# Patient Record
Sex: FEMALE | Race: OTHER | HISPANIC OR LATINO | URBAN - METROPOLITAN AREA
[De-identification: names, ages, dates, MRNs, and addresses within clinical notes are randomized per-mention and may not be internally consistent; named-entity substitution may affect disease eponyms.]

---

## 2019-03-31 ENCOUNTER — INPATIENT (INPATIENT)
Facility: HOSPITAL | Age: 39
LOS: 0 days | Discharge: LEFT BEFORE DISCHARGE | DRG: 392 | End: 2019-04-01
Attending: INTERNAL MEDICINE | Admitting: INTERNAL MEDICINE
Payer: SELF-PAY

## 2019-03-31 VITALS
TEMPERATURE: 98 F | WEIGHT: 158.73 LBS | SYSTOLIC BLOOD PRESSURE: 140 MMHG | OXYGEN SATURATION: 99 % | HEART RATE: 79 BPM | HEIGHT: 64 IN | DIASTOLIC BLOOD PRESSURE: 86 MMHG | RESPIRATION RATE: 19 BRPM

## 2019-03-31 LAB
ALBUMIN SERPL ELPH-MCNC: 4 G/DL — SIGNIFICANT CHANGE UP (ref 3.3–5)
ALBUMIN SERPL ELPH-MCNC: 4.5 G/DL — SIGNIFICANT CHANGE UP (ref 3.3–5)
ALP SERPL-CCNC: 119 U/L — SIGNIFICANT CHANGE UP (ref 40–120)
ALP SERPL-CCNC: 134 U/L — HIGH (ref 40–120)
ALT FLD-CCNC: 26 U/L — SIGNIFICANT CHANGE UP (ref 10–45)
ALT FLD-CCNC: 26 U/L — SIGNIFICANT CHANGE UP (ref 10–45)
AMYLASE P1 CFR SERPL: 61 U/L — SIGNIFICANT CHANGE UP (ref 25–125)
ANION GAP SERPL CALC-SCNC: 11 MMOL/L — SIGNIFICANT CHANGE UP (ref 5–17)
ANION GAP SERPL CALC-SCNC: 14 MMOL/L — SIGNIFICANT CHANGE UP (ref 5–17)
APPEARANCE UR: CLEAR — SIGNIFICANT CHANGE UP
APTT BLD: 35.1 SEC — SIGNIFICANT CHANGE UP (ref 27.5–36.3)
AST SERPL-CCNC: 20 U/L — SIGNIFICANT CHANGE UP (ref 10–40)
AST SERPL-CCNC: 20 U/L — SIGNIFICANT CHANGE UP (ref 10–40)
BASE EXCESS BLDV CALC-SCNC: -1.8 MMOL/L — SIGNIFICANT CHANGE UP
BASOPHILS # BLD AUTO: 0.05 K/UL — SIGNIFICANT CHANGE UP (ref 0–0.2)
BASOPHILS # BLD AUTO: 0.07 K/UL — SIGNIFICANT CHANGE UP (ref 0–0.2)
BASOPHILS NFR BLD AUTO: 0.8 % — SIGNIFICANT CHANGE UP (ref 0–2)
BASOPHILS NFR BLD AUTO: 0.8 % — SIGNIFICANT CHANGE UP (ref 0–2)
BILIRUB SERPL-MCNC: 0.2 MG/DL — SIGNIFICANT CHANGE UP (ref 0.2–1.2)
BILIRUB SERPL-MCNC: <0.2 MG/DL — SIGNIFICANT CHANGE UP (ref 0.2–1.2)
BILIRUB UR-MCNC: NEGATIVE — SIGNIFICANT CHANGE UP
BLD GP AB SCN SERPL QL: NEGATIVE — SIGNIFICANT CHANGE UP
BUN SERPL-MCNC: 15 MG/DL — SIGNIFICANT CHANGE UP (ref 7–23)
BUN SERPL-MCNC: 16 MG/DL — SIGNIFICANT CHANGE UP (ref 7–23)
CA-I SERPL-SCNC: 1.16 MMOL/L — SIGNIFICANT CHANGE UP (ref 1.12–1.3)
CALCIUM SERPL-MCNC: 8.9 MG/DL — SIGNIFICANT CHANGE UP (ref 8.4–10.5)
CALCIUM SERPL-MCNC: 9.4 MG/DL — SIGNIFICANT CHANGE UP (ref 8.4–10.5)
CHLORIDE SERPL-SCNC: 106 MMOL/L — SIGNIFICANT CHANGE UP (ref 96–108)
CHLORIDE SERPL-SCNC: 108 MMOL/L — SIGNIFICANT CHANGE UP (ref 96–108)
CO2 SERPL-SCNC: 20 MMOL/L — LOW (ref 22–31)
CO2 SERPL-SCNC: 22 MMOL/L — SIGNIFICANT CHANGE UP (ref 22–31)
COLOR SPEC: YELLOW — SIGNIFICANT CHANGE UP
CREAT SERPL-MCNC: 0.69 MG/DL — SIGNIFICANT CHANGE UP (ref 0.5–1.3)
CREAT SERPL-MCNC: 0.73 MG/DL — SIGNIFICANT CHANGE UP (ref 0.5–1.3)
DIFF PNL FLD: NEGATIVE — SIGNIFICANT CHANGE UP
EOSINOPHIL # BLD AUTO: 0.38 K/UL — SIGNIFICANT CHANGE UP (ref 0–0.5)
EOSINOPHIL # BLD AUTO: 0.55 K/UL — HIGH (ref 0–0.5)
EOSINOPHIL NFR BLD AUTO: 5.8 % — SIGNIFICANT CHANGE UP (ref 0–6)
EOSINOPHIL NFR BLD AUTO: 6.6 % — HIGH (ref 0–6)
GAS PNL BLDV: 136 MMOL/L — LOW (ref 138–146)
GAS PNL BLDV: SIGNIFICANT CHANGE UP
GAS PNL BLDV: SIGNIFICANT CHANGE UP
GLUCOSE SERPL-MCNC: 100 MG/DL — HIGH (ref 70–99)
GLUCOSE SERPL-MCNC: 122 MG/DL — HIGH (ref 70–99)
GLUCOSE UR QL: NEGATIVE — SIGNIFICANT CHANGE UP
HCG SERPL-ACNC: 0.4 MIU/ML — SIGNIFICANT CHANGE UP
HCO3 BLDV-SCNC: 22 MMOL/L — SIGNIFICANT CHANGE UP (ref 20–27)
HCT VFR BLD CALC: 32.6 % — LOW (ref 34.5–45)
HCT VFR BLD CALC: 34.9 % — SIGNIFICANT CHANGE UP (ref 34.5–45)
HCT VFR BLD CALC: 38.3 % — SIGNIFICANT CHANGE UP (ref 34.5–45)
HGB BLD-MCNC: 10.5 G/DL — LOW (ref 11.5–15.5)
HGB BLD-MCNC: 11.3 G/DL — LOW (ref 11.5–15.5)
HGB BLD-MCNC: 12.4 G/DL — SIGNIFICANT CHANGE UP (ref 11.5–15.5)
IMM GRANULOCYTES NFR BLD AUTO: 0.5 % — SIGNIFICANT CHANGE UP (ref 0–1.5)
IMM GRANULOCYTES NFR BLD AUTO: 0.6 % — SIGNIFICANT CHANGE UP (ref 0–1.5)
INR BLD: 0.99 — SIGNIFICANT CHANGE UP (ref 0.88–1.16)
KETONES UR-MCNC: NEGATIVE — SIGNIFICANT CHANGE UP
LACTATE SERPL-SCNC: 0.7 MMOL/L — SIGNIFICANT CHANGE UP (ref 0.5–2)
LACTATE SERPL-SCNC: 0.8 MMOL/L — SIGNIFICANT CHANGE UP (ref 0.5–2)
LACTATE SERPL-SCNC: 1.1 MMOL/L — SIGNIFICANT CHANGE UP (ref 0.5–2)
LEUKOCYTE ESTERASE UR-ACNC: NEGATIVE — SIGNIFICANT CHANGE UP
LIDOCAIN IGE QN: 26 U/L — SIGNIFICANT CHANGE UP (ref 7–60)
LYMPHOCYTES # BLD AUTO: 1.45 K/UL — SIGNIFICANT CHANGE UP (ref 1–3.3)
LYMPHOCYTES # BLD AUTO: 2.11 K/UL — SIGNIFICANT CHANGE UP (ref 1–3.3)
LYMPHOCYTES # BLD AUTO: 22 % — SIGNIFICANT CHANGE UP (ref 13–44)
LYMPHOCYTES # BLD AUTO: 25.2 % — SIGNIFICANT CHANGE UP (ref 13–44)
MCHC RBC-ENTMCNC: 27.8 PG — SIGNIFICANT CHANGE UP (ref 27–34)
MCHC RBC-ENTMCNC: 27.9 PG — SIGNIFICANT CHANGE UP (ref 27–34)
MCHC RBC-ENTMCNC: 28.1 PG — SIGNIFICANT CHANGE UP (ref 27–34)
MCHC RBC-ENTMCNC: 32.2 GM/DL — SIGNIFICANT CHANGE UP (ref 32–36)
MCHC RBC-ENTMCNC: 32.4 GM/DL — SIGNIFICANT CHANGE UP (ref 32–36)
MCHC RBC-ENTMCNC: 32.4 GM/DL — SIGNIFICANT CHANGE UP (ref 32–36)
MCV RBC AUTO: 86 FL — SIGNIFICANT CHANGE UP (ref 80–100)
MCV RBC AUTO: 86.5 FL — SIGNIFICANT CHANGE UP (ref 80–100)
MCV RBC AUTO: 86.8 FL — SIGNIFICANT CHANGE UP (ref 80–100)
MONOCYTES # BLD AUTO: 0.54 K/UL — SIGNIFICANT CHANGE UP (ref 0–0.9)
MONOCYTES # BLD AUTO: 0.6 K/UL — SIGNIFICANT CHANGE UP (ref 0–0.9)
MONOCYTES NFR BLD AUTO: 7.2 % — SIGNIFICANT CHANGE UP (ref 2–14)
MONOCYTES NFR BLD AUTO: 8.2 % — SIGNIFICANT CHANGE UP (ref 2–14)
NEUTROPHILS # BLD AUTO: 4.13 K/UL — SIGNIFICANT CHANGE UP (ref 1.8–7.4)
NEUTROPHILS # BLD AUTO: 4.98 K/UL — SIGNIFICANT CHANGE UP (ref 1.8–7.4)
NEUTROPHILS NFR BLD AUTO: 59.6 % — SIGNIFICANT CHANGE UP (ref 43–77)
NEUTROPHILS NFR BLD AUTO: 62.7 % — SIGNIFICANT CHANGE UP (ref 43–77)
NITRITE UR-MCNC: NEGATIVE — SIGNIFICANT CHANGE UP
NRBC # BLD: 0 /100 WBCS — SIGNIFICANT CHANGE UP (ref 0–0)
PCO2 BLDV: 34 MMHG — LOW (ref 41–51)
PH BLDV: 7.43 — SIGNIFICANT CHANGE UP (ref 7.32–7.43)
PH UR: 5.5 — SIGNIFICANT CHANGE UP (ref 5–8)
PLATELET # BLD AUTO: 219 K/UL — SIGNIFICANT CHANGE UP (ref 150–400)
PLATELET # BLD AUTO: 236 K/UL — SIGNIFICANT CHANGE UP (ref 150–400)
PLATELET # BLD AUTO: 247 K/UL — SIGNIFICANT CHANGE UP (ref 150–400)
PO2 BLDV: 202 MMHG — SIGNIFICANT CHANGE UP
POTASSIUM BLDV-SCNC: 4.2 MMOL/L — SIGNIFICANT CHANGE UP (ref 3.5–4.9)
POTASSIUM SERPL-MCNC: 4.2 MMOL/L — SIGNIFICANT CHANGE UP (ref 3.5–5.3)
POTASSIUM SERPL-MCNC: 4.3 MMOL/L — SIGNIFICANT CHANGE UP (ref 3.5–5.3)
POTASSIUM SERPL-SCNC: 4.2 MMOL/L — SIGNIFICANT CHANGE UP (ref 3.5–5.3)
POTASSIUM SERPL-SCNC: 4.3 MMOL/L — SIGNIFICANT CHANGE UP (ref 3.5–5.3)
PROT SERPL-MCNC: 6.7 G/DL — SIGNIFICANT CHANGE UP (ref 6–8.3)
PROT SERPL-MCNC: 7.5 G/DL — SIGNIFICANT CHANGE UP (ref 6–8.3)
PROT UR-MCNC: NEGATIVE MG/DL — SIGNIFICANT CHANGE UP
PROTHROM AB SERPL-ACNC: 11.2 SEC — SIGNIFICANT CHANGE UP (ref 10–12.9)
RBC # BLD: 3.77 M/UL — LOW (ref 3.8–5.2)
RBC # BLD: 4.06 M/UL — SIGNIFICANT CHANGE UP (ref 3.8–5.2)
RBC # BLD: 4.41 M/UL — SIGNIFICANT CHANGE UP (ref 3.8–5.2)
RBC # FLD: 15.9 % — HIGH (ref 10.3–14.5)
RH IG SCN BLD-IMP: POSITIVE — SIGNIFICANT CHANGE UP
RH IG SCN BLD-IMP: POSITIVE — SIGNIFICANT CHANGE UP
SAO2 % BLDV: 99 % — SIGNIFICANT CHANGE UP
SODIUM SERPL-SCNC: 139 MMOL/L — SIGNIFICANT CHANGE UP (ref 135–145)
SODIUM SERPL-SCNC: 142 MMOL/L — SIGNIFICANT CHANGE UP (ref 135–145)
SP GR SPEC: 1.02 — SIGNIFICANT CHANGE UP (ref 1–1.03)
UROBILINOGEN FLD QL: 0.2 E.U./DL — SIGNIFICANT CHANGE UP
WBC # BLD: 6.35 K/UL — SIGNIFICANT CHANGE UP (ref 3.8–10.5)
WBC # BLD: 6.58 K/UL — SIGNIFICANT CHANGE UP (ref 3.8–10.5)
WBC # BLD: 8.36 K/UL — SIGNIFICANT CHANGE UP (ref 3.8–10.5)
WBC # FLD AUTO: 6.35 K/UL — SIGNIFICANT CHANGE UP (ref 3.8–10.5)
WBC # FLD AUTO: 6.58 K/UL — SIGNIFICANT CHANGE UP (ref 3.8–10.5)
WBC # FLD AUTO: 8.36 K/UL — SIGNIFICANT CHANGE UP (ref 3.8–10.5)

## 2019-03-31 PROCEDURE — 93010 ELECTROCARDIOGRAM REPORT: CPT

## 2019-03-31 PROCEDURE — 43235 EGD DIAGNOSTIC BRUSH WASH: CPT

## 2019-03-31 PROCEDURE — 76937 US GUIDE VASCULAR ACCESS: CPT | Mod: 26,GC

## 2019-03-31 PROCEDURE — 99223 1ST HOSP IP/OBS HIGH 75: CPT | Mod: 25

## 2019-03-31 PROCEDURE — 99291 CRITICAL CARE FIRST HOUR: CPT

## 2019-03-31 PROCEDURE — 76830 TRANSVAGINAL US NON-OB: CPT | Mod: 26

## 2019-03-31 PROCEDURE — 71045 X-RAY EXAM CHEST 1 VIEW: CPT | Mod: 26,59,76,76

## 2019-03-31 PROCEDURE — 36556 INSERT NON-TUNNEL CV CATH: CPT | Mod: GC

## 2019-03-31 PROCEDURE — 76705 ECHO EXAM OF ABDOMEN: CPT | Mod: 26

## 2019-03-31 PROCEDURE — 76856 US EXAM PELVIC COMPLETE: CPT | Mod: 26

## 2019-03-31 PROCEDURE — 74176 CT ABD & PELVIS W/O CONTRAST: CPT | Mod: 26

## 2019-03-31 PROCEDURE — 71046 X-RAY EXAM CHEST 2 VIEWS: CPT | Mod: 26

## 2019-03-31 PROCEDURE — 74019 RADEX ABDOMEN 2 VIEWS: CPT | Mod: 26

## 2019-03-31 RX ORDER — INFLUENZA VIRUS VACCINE 15; 15; 15; 15 UG/.5ML; UG/.5ML; UG/.5ML; UG/.5ML
0.5 SUSPENSION INTRAMUSCULAR ONCE
Qty: 0 | Refills: 0 | Status: DISCONTINUED | OUTPATIENT
Start: 2019-03-31 | End: 2019-04-01

## 2019-03-31 RX ORDER — RACEPINEPHRINE HCL 2.25 %
1 SOLUTION, NON-ORAL TOPICAL ONCE
Qty: 0 | Refills: 0 | Status: DISCONTINUED | OUTPATIENT
Start: 2019-03-31 | End: 2019-03-31

## 2019-03-31 RX ORDER — DIPHENHYDRAMINE HCL 50 MG
25 CAPSULE ORAL ONCE
Qty: 0 | Refills: 0 | Status: COMPLETED | OUTPATIENT
Start: 2019-03-31 | End: 2019-03-31

## 2019-03-31 RX ORDER — PANTOPRAZOLE SODIUM 20 MG/1
40 TABLET, DELAYED RELEASE ORAL DAILY
Qty: 0 | Refills: 0 | Status: DISCONTINUED | OUTPATIENT
Start: 2019-03-31 | End: 2019-03-31

## 2019-03-31 RX ORDER — PROPOFOL 10 MG/ML
5 INJECTION, EMULSION INTRAVENOUS
Qty: 1000 | Refills: 0 | Status: DISCONTINUED | OUTPATIENT
Start: 2019-03-31 | End: 2019-04-01

## 2019-03-31 RX ORDER — PANTOPRAZOLE SODIUM 20 MG/1
80 TABLET, DELAYED RELEASE ORAL ONCE
Qty: 0 | Refills: 0 | Status: COMPLETED | OUTPATIENT
Start: 2019-03-31 | End: 2019-03-31

## 2019-03-31 RX ORDER — SODIUM CHLORIDE 9 MG/ML
1000 INJECTION INTRAMUSCULAR; INTRAVENOUS; SUBCUTANEOUS ONCE
Qty: 0 | Refills: 0 | Status: COMPLETED | OUTPATIENT
Start: 2019-03-31 | End: 2019-03-31

## 2019-03-31 RX ORDER — MIDAZOLAM HYDROCHLORIDE 1 MG/ML
2 INJECTION, SOLUTION INTRAMUSCULAR; INTRAVENOUS ONCE
Qty: 0 | Refills: 0 | Status: DISCONTINUED | OUTPATIENT
Start: 2019-03-31 | End: 2019-03-31

## 2019-03-31 RX ORDER — METOCLOPRAMIDE HCL 10 MG
10 TABLET ORAL ONCE
Qty: 0 | Refills: 0 | Status: COMPLETED | OUTPATIENT
Start: 2019-03-31 | End: 2019-03-31

## 2019-03-31 RX ORDER — MORPHINE SULFATE 50 MG/1
2 CAPSULE, EXTENDED RELEASE ORAL EVERY 4 HOURS
Qty: 0 | Refills: 0 | Status: DISCONTINUED | OUTPATIENT
Start: 2019-03-31 | End: 2019-03-31

## 2019-03-31 RX ORDER — FENTANYL CITRATE 50 UG/ML
0.5 INJECTION INTRAVENOUS
Qty: 2500 | Refills: 0 | Status: DISCONTINUED | OUTPATIENT
Start: 2019-03-31 | End: 2019-04-01

## 2019-03-31 RX ORDER — ONDANSETRON 8 MG/1
4 TABLET, FILM COATED ORAL ONCE
Qty: 0 | Refills: 0 | Status: COMPLETED | OUTPATIENT
Start: 2019-03-31 | End: 2019-03-31

## 2019-03-31 RX ORDER — HYDROMORPHONE HYDROCHLORIDE 2 MG/ML
2 INJECTION INTRAMUSCULAR; INTRAVENOUS; SUBCUTANEOUS ONCE
Qty: 0 | Refills: 0 | Status: DISCONTINUED | OUTPATIENT
Start: 2019-03-31 | End: 2019-03-31

## 2019-03-31 RX ORDER — HYDROMORPHONE HYDROCHLORIDE 2 MG/ML
1 INJECTION INTRAMUSCULAR; INTRAVENOUS; SUBCUTANEOUS EVERY 6 HOURS
Qty: 0 | Refills: 0 | Status: DISCONTINUED | OUTPATIENT
Start: 2019-03-31 | End: 2019-03-31

## 2019-03-31 RX ORDER — PANTOPRAZOLE SODIUM 20 MG/1
8 TABLET, DELAYED RELEASE ORAL
Qty: 80 | Refills: 0 | Status: DISCONTINUED | OUTPATIENT
Start: 2019-03-31 | End: 2019-03-31

## 2019-03-31 RX ORDER — PANTOPRAZOLE SODIUM 20 MG/1
40 TABLET, DELAYED RELEASE ORAL EVERY 12 HOURS
Qty: 0 | Refills: 0 | Status: DISCONTINUED | OUTPATIENT
Start: 2019-03-31 | End: 2019-04-01

## 2019-03-31 RX ORDER — HYDROMORPHONE HYDROCHLORIDE 2 MG/ML
1 INJECTION INTRAMUSCULAR; INTRAVENOUS; SUBCUTANEOUS ONCE
Qty: 0 | Refills: 0 | Status: DISCONTINUED | OUTPATIENT
Start: 2019-03-31 | End: 2019-03-31

## 2019-03-31 RX ADMIN — PANTOPRAZOLE SODIUM 80 MILLIGRAM(S): 20 TABLET, DELAYED RELEASE ORAL at 11:19

## 2019-03-31 RX ADMIN — PROPOFOL 2.27 MICROGRAM(S)/KG/MIN: 10 INJECTION, EMULSION INTRAVENOUS at 23:33

## 2019-03-31 RX ADMIN — Medication 1 MILLIGRAM(S): at 15:24

## 2019-03-31 RX ADMIN — Medication 25 MILLIGRAM(S): at 10:03

## 2019-03-31 RX ADMIN — HYDROMORPHONE HYDROCHLORIDE 1 MILLIGRAM(S): 2 INJECTION INTRAMUSCULAR; INTRAVENOUS; SUBCUTANEOUS at 13:30

## 2019-03-31 RX ADMIN — PROPOFOL 2.27 MICROGRAM(S)/KG/MIN: 10 INJECTION, EMULSION INTRAVENOUS at 17:10

## 2019-03-31 RX ADMIN — SODIUM CHLORIDE 1000 MILLILITER(S): 9 INJECTION INTRAMUSCULAR; INTRAVENOUS; SUBCUTANEOUS at 10:04

## 2019-03-31 RX ADMIN — Medication 10 MILLIGRAM(S): at 11:51

## 2019-03-31 RX ADMIN — HYDROMORPHONE HYDROCHLORIDE 1 MILLIGRAM(S): 2 INJECTION INTRAMUSCULAR; INTRAVENOUS; SUBCUTANEOUS at 10:40

## 2019-03-31 RX ADMIN — Medication 25 MILLIGRAM(S): at 11:19

## 2019-03-31 RX ADMIN — Medication 25 MILLIGRAM(S): at 17:30

## 2019-03-31 RX ADMIN — MIDAZOLAM HYDROCHLORIDE 2 MILLIGRAM(S): 1 INJECTION, SOLUTION INTRAMUSCULAR; INTRAVENOUS at 17:30

## 2019-03-31 RX ADMIN — PANTOPRAZOLE SODIUM 10 MG/HR: 20 TABLET, DELAYED RELEASE ORAL at 13:03

## 2019-03-31 RX ADMIN — FENTANYL CITRATE 3.77 MICROGRAM(S)/KG/HR: 50 INJECTION INTRAVENOUS at 17:20

## 2019-03-31 RX ADMIN — HYDROMORPHONE HYDROCHLORIDE 1 MILLIGRAM(S): 2 INJECTION INTRAMUSCULAR; INTRAVENOUS; SUBCUTANEOUS at 12:20

## 2019-03-31 RX ADMIN — HYDROMORPHONE HYDROCHLORIDE 1 MILLIGRAM(S): 2 INJECTION INTRAMUSCULAR; INTRAVENOUS; SUBCUTANEOUS at 11:50

## 2019-03-31 RX ADMIN — HYDROMORPHONE HYDROCHLORIDE 2 MILLIGRAM(S): 2 INJECTION INTRAMUSCULAR; INTRAVENOUS; SUBCUTANEOUS at 16:05

## 2019-03-31 RX ADMIN — HYDROMORPHONE HYDROCHLORIDE 1 MILLIGRAM(S): 2 INJECTION INTRAMUSCULAR; INTRAVENOUS; SUBCUTANEOUS at 13:15

## 2019-03-31 RX ADMIN — HYDROMORPHONE HYDROCHLORIDE 2 MILLIGRAM(S): 2 INJECTION INTRAMUSCULAR; INTRAVENOUS; SUBCUTANEOUS at 15:52

## 2019-03-31 RX ADMIN — ONDANSETRON 4 MILLIGRAM(S): 8 TABLET, FILM COATED ORAL at 11:19

## 2019-03-31 RX ADMIN — Medication 25 MILLIGRAM(S): at 23:31

## 2019-03-31 RX ADMIN — HYDROMORPHONE HYDROCHLORIDE 1 MILLIGRAM(S): 2 INJECTION INTRAMUSCULAR; INTRAVENOUS; SUBCUTANEOUS at 10:03

## 2019-03-31 NOTE — PROCEDURE NOTE - ADDITIONAL PROCEDURE DETAILS
Left Internal jugular vein was attempted first, cannulated easily however dilation failed in the setting of extensive scar tissues and procedure aborted, Dr. Osuna placed a right subclavian central line with easy cannulation and dilation without complication, patient remained HDS throughout the procedure, post procedure CXR obtained which shows proper line placement and no evidence of pneumothorax.

## 2019-03-31 NOTE — ED PROVIDER NOTE - CLINICAL SUMMARY MEDICAL DECISION MAKING FREE TEXT BOX
47 yo F with hematemesis 4-5 episodes, ongoing abd pain - neg CT - req admission for prbc transfusion and EGD

## 2019-03-31 NOTE — ED PROVIDER NOTE - CARE PLAN
Principal Discharge DX:	Nausea & vomiting  Secondary Diagnosis:	Hematemesis  Secondary Diagnosis:	Abdominal pain

## 2019-03-31 NOTE — CONSULT NOTE ADULT - SUBJECTIVE AND OBJECTIVE BOX
ICU Consult Medicine Resident Note    Pt is a 37 yo F with h/o Colonic perf s/p surgery in Dec 18 in [facility] with no other PMH presents with c/o N/V and hematemesis starting yesterday evening associated with 10/10 epigastric/Rt UQ  Sharp abdominal pain radiating to the back. Nothing makes it better and movement makes it worse.   Pt noticed the abdominal pain about 4 days ago with N/V which was initially 4/10 and feeds making it worse. For this the pt was taking Advil for the pain and for the last few days increased the dose. Pt also endorses having bowel movements with passing of flatus last movement this am in the ER waiting room.   Pt denies headache, blurry of vision, SOB, fevers, chills, Chest pain, palpitations, dysuria, leg swelling or dizziness.     In the ED Pt given Abdominal Xray with no obvious free air but visible IVC filter reason unknown. Pt given Dilaudid with benadryl x2 for itches.  Pt started on Protonix drip. Reglan and Zofran given.     Past Medical History: Denies   Past Surgical History: Colonic perf surgery   Medications: None   Allergies: IV contrast.   Social History: Never smoker, alcohol or recreational drugs.   Family History: Denies any pertinent surgery     Physical Examination:   Vital Signs Last 24 Hrs  T(C): 36.8 (31 Mar 2019 12:06), Max: 36.8 (31 Mar 2019 08:50)  T(F): 98.3 (31 Mar 2019 12:06), Max: 98.3 (31 Mar 2019 12:06)  HR: 91 (31 Mar 2019 12:06) (79 - 91)  BP: 152/91 (31 Mar 2019 12:06) (140/86 - 152/91)  BP(mean): --  RR: 18 (31 Mar 2019 12:06) (18 - 19)  SpO2: 100% (31 Mar 2019 12:06) (99% - 100%)  I&O's Detail    CAPILLARY BLOOD GLUCOSE      Constitutional: Pt lying in stretcher visibly in pain   Head: NC/AT  Eyes: PERRLA, EOMI, clear conjunctiva  ENT: no nasal discharge; no oropharyngeal erythema or exudates; moist oral mucosa  Neck: supple; no JVD or thyromegaly  Respiratory: CTA B/L; no W/R/R, no retractions  Cardiac: +S1/S2; RRR; no M/R/G; PMI non-displaced  Gastrointestinal: Tender, guarding, tense to palpation, multiple laprascopic scars with midline scar.  Extremities: WWP, no clubbing or cyanosis; no peripheral edema  Vascular: 2+ radial, DP/PT pulses B/L  Lymphatic: no submandibular or cervical LAD  Neurologic: AAOx3; answers questions appropriately, follows commands, moves all extremities, CNII-XII grossly intact; no focal deficits, motor 5/5 in UE and LE, Reflexes 2+ in UE and LE b/l      Labs/Imaging:  < from: CT Abdomen and Pelvis No Cont (03.31.19 @ 11:50) >  IMPRESSION:  1.  Hyperdense material in the stomach may represent blood products   versus ingested material.  2.  Hepatic steatosis.  3.  Asymmetric nodularity of left breast. Nonemergent breast ultrasound   and/or mammogram is recommended.  4.  Compression deformity T11 vertebral body.        CBC Full  -  ( 31 Mar 2019 09:39 )  WBC Count : 8.36 K/uL  RBC Count : 4.41 M/uL  Hemoglobin : 12.4 g/dL  Hematocrit : 38.3 %  Platelet Count - Automated : 247 K/uL  Mean Cell Volume : 86.8 fl  Mean Cell Hemoglobin : 28.1 pg  Mean Cell Hemoglobin Concentration : 32.4 gm/dL  Auto Neutrophil # : 4.98 K/uL  Auto Lymphocyte # : 2.11 K/uL  Auto Monocyte # : 0.60 K/uL  Auto Eosinophil # : 0.55 K/uL  Auto Basophil # : 0.07 K/uL  Auto Neutrophil % : 59.6 %  Auto Lymphocyte % : 25.2 %  Auto Monocyte % : 7.2 %  Auto Eosinophil % : 6.6 %  Auto Basophil % : 0.8 %    03-31    142  |  108  |  15  ----------------------------<  122<H>  4.3   |  20<L>  |  0.69    Ca    9.4      31 Mar 2019 09:39    TPro  7.5  /  Alb  4.5  /  TBili  0.2  /  DBili  x   /  AST  20  /  ALT  26  /  AlkPhos  134<H>  03-31    LIVER FUNCTIONS - ( 31 Mar 2019 09:39 )  Alb: 4.5 g/dL / Pro: 7.5 g/dL / ALK PHOS: 134 U/L / ALT: 26 U/L / AST: 20 U/L / GGT: x           Lactate, Blood (03.31.19 @ 10:51)    Lactate, Blood: 1.1 mmoL/L

## 2019-03-31 NOTE — ED ADULT TRIAGE NOTE - OTHER COMPLAINTS
pt states she has a hx of intestinal perforation and resection that was done in [facility] in December 2018.

## 2019-03-31 NOTE — PROCEDURE NOTE - NSPROCDETAILS_GEN_ALL_CORE
sterile technique, catheter placed/ultrasound guidance/lumen(s) aspirated and flushed/sterile dressing applied/guidewire recovered
lumen(s) aspirated and flushed/sterile dressing applied/ultrasound guidance/guidewire recovered/sterile technique, catheter placed
patient pre-oxygenated, tube inserted, placement confirmed

## 2019-03-31 NOTE — H&P ADULT - HISTORY OF PRESENT ILLNESS
39 yo F with h/o Colonic perforation s/p surgery in Dec 18 in [facility] with no other PMH presents with c/o N/V and hematemesis starting yesterday evening associated with 10/10 epigastric and RUQ, characterized as radiating to the back. Nothing makes it better and movement makes it worse.  Pt noticed the abdominal pain about 4 days ago with N/V which was initially 4/10 and feeds making it worse. For this the pt was taking Advil for the pain and for the last few days increased the dose. Pt also endorses having bowel movements with passing of flatus last movement this am in the ER waiting room.   Pt denies headache, blurry of vision, SOB, fevers, chills, Chest pain, palpitations, dysuria, leg swelling or dizziness.     In the ED Pt given Abdominal Xray with no obvious free air but visible IVC filter reason unknown. Pt given Dilaudid with benadryl x2 for itches.  Pt started on Protonix drip. Reglan and Zofran given.     Past Medical History: Denies   Past Surgical History: Colonic perf surgery   Medications: None   Allergies: IV contrast.   Social History: Never smoker, alcohol or recreational drugs.   Family History: Denies any pertinent surgery

## 2019-03-31 NOTE — CONSULT NOTE ADULT - SUBJECTIVE AND OBJECTIVE BOX
Surgery Consult Note:     38 year old female w/ h/o Colonic perf s/p colectomy in 12/18 in [place], h/o appendectomy in early 2000s w/ no other known PMH presents with 4 day history of abdominal pain that has become worse over the past day. Of note the patient had 4 episodes of hematemesis in the ED. The patient denies CP / SOB / fevers / chills. The patient continues to pass flatus and have bowel movements.       Vital Signs Last 24 Hrs  T(C): 36.8 (31 Mar 2019 12:06), Max: 36.8 (31 Mar 2019 08:50)  T(F): 98.3 (31 Mar 2019 12:06), Max: 98.3 (31 Mar 2019 12:06)  HR: 91 (31 Mar 2019 12:06) (79 - 91)  BP: 152/91 (31 Mar 2019 12:06) (140/86 - 152/91)  BP(mean): --  RR: 18 (31 Mar 2019 12:06) (18 - 19)  SpO2: 100% (31 Mar 2019 12:06) (99% - 100%)    PHYSICAL EXAM  Gen: visibly distressed   Resp: CTA BL   CV: RRR  Abd: soft, diffusely ttp in epigastric region with some guarding   healed midline exploratory laparotomy incision   no obvious masses or hernias     LABS:                        11.3   6.58  )-----------( 236      ( 31 Mar 2019 12:27 )             34.9     03-31    142  |  108  |  15  ----------------------------<  122<H>  4.3   |  20<L>  |  0.69    Ca    9.4      31 Mar 2019 09:39    TPro  7.5  /  Alb  4.5  /  TBili  0.2  /  DBili  x   /  AST  20  /  ALT  26  /  AlkPhos  134<H>  03-31          RADIOLOGY & ADDITIONAL STUDIES:    CXR --> no evidence of free air / obstruction   CT scan -->   1.  Hyperdense material in the stomach may represent blood products   versus ingested material.  2.  Hepatic steatosis.  3.  Asymmetric nodularity of left breast. Nonemergent breast ultrasound   and/or mammogram is recommended.  4.  Compression deformity T11 vertebral body.

## 2019-03-31 NOTE — H&P ADULT - ASSESSMENT
39 yo F with h/o as above presenting for Hematemesis with bright red blood with multiple episodes. Pt with only significant h/o colonic surgery for apparent perf in the past. Pt with dyspepsia like symptoms for the last 4 days and unknowingly aggravated symptoms with NSAIDS now with hematemesis. O/e pt noted to have tense, tender abdomen with guarding. Admitted to MICU for hematemesis likely 2/2 Peptic Ulcer and concerns for acute abdomen.      Neurology/psychiatry  No active issues      Cardiovascular:    Currently normotensive, not in hypovolemic shock (euvolemic on exam, not tachycardic, no chest pain)    Hemodynamics:   -Euvolemic on exam  -maintain MAP > 60-65  -active type and screen      Pulmonary:   No active issues      Infectious disease:   No leukocytosis, no fever, CXR clear    Gastrointestinal:   #Hematemesis  Presenting with 10/10 RUQ pain radiating to back with hematemesis. CXR negative for perforated viscou. Currently hemodynamically stable Differential include peptic ulcer 2/2 to recent advil use, Carole mcdaniel tear, less likely Boorhave syndrome.  -PPI     Renal/electrolytes:   no active issues      Endocrine:   no active issues      Hematology/oncology:   no active issues      Dermatology/MSK  no active issues      Nutrition  Fluids:  Electrolytes: Mg>2, K>4 replete PRN   Nutrition:     Prophylaxis  DVT:  GI:  Aspiration:     AccessLines:  -Central  -Peripheral yes    Sommers:no    Dispo: Full 37 yo F with h/o as above presenting for Hematemesis with bright red blood with multiple episodes. Pt with only significant h/o colonic surgery for apparent perf in the past. Pt with dyspepsia like symptoms for the last 4 days and unknowingly aggravated symptoms with NSAIDS now with hematemesis. O/e pt noted to have tense, tender abdomen with guarding. Admitted to MICU for hematemesis likely 2/2 Peptic Ulcer and concerns for acute abdomen.      Neurology/psychiatry  No active issues      Cardiovascular:    Currently normotensive, not in hypovolemic shock (euvolemic on exam, not tachycardic, no chest pain)    Hemodynamics:   -Euvolemic on exam  -maintain MAP > 60-65  -active type and screen      Pulmonary:   No active issues      Infectious disease:   No leukocytosis, no fever, CXR clear    Gastrointestinal:   #Hematemesis  Presenting with 10/10 RUQ pain radiating to back with hematemesis and abdominal pain x 4 days . CXR negative for perforated viscous. Currently hemodynamically stable Differential include peptic ulcer 2/2 to recent advil use, gastritis, foreign body ingestion, Carole mcdaniel tear, less likely Boorhave syndrome. Other considerations include ovarian torsion, bowel obstruction.   -CT A/P: hyperdense material in stomach may represent foreign body, no aortic aneurysm no pancreatitis, IVC filter in place  -lipase WNL   -PPI IV  -Central line due to pt having more peripheral access   -type and screen  -f/u abdominal ultrasound  -f/u GI recs, Ob/GYN    Renal/electrolytes:   no active issues      Endocrine:   no active issues      Hematology/oncology:   no active issues      Dermatology/MSK  no active issues      Nutrition  Fluids:  Electrolytes: Mg>2, K>4 replete PRN   Nutrition:     Prophylaxis  DVT:  GI:  Aspiration:     AccessLines:  -Central  -Peripheral yes    Sommers:no    Dispo: Full 37 yo F with h/o as above presenting for Hematemesis with bright red blood with multiple episodes. Pt with only significant h/o colonic surgery for apparent perf in the past. Pt with dyspepsia like symptoms for the last 4 days and unknowingly aggravated symptoms with NSAIDS now with hematemesis. O/e pt noted to have tense, tender abdomen with guarding. Admitted to MICU for hematemesis likely 2/2 Peptic Ulcer and concerns for acute abdomen.      Neurology/psychiatry  No active issues      Cardiovascular:    Currently normotensive, not in hypovolemic shock (euvolemic on exam, not tachycardic, no chest pain)    Hemodynamics:   -Euvolemic on exam  -maintain MAP > 60-65  -active type and screen      Pulmonary:   No active issues      Infectious disease:   No leukocytosis, no fever, CXR clear    Gastrointestinal:   #Hematemesis  Presenting with 10/10 RUQ pain radiating to back with hematemesis and abdominal pain x 4 days . Exam notable for guarding. CXR negative for perforated viscous. Currently hemodynamically stable Differential include peptic ulcer 2/2 to recent advil use, gastritis, foreign body ingestion, Carole mcdaniel tear, less likely Boorhave syndrome. Other considerations include ovarian torsion, bowel obstruction.   -CT A/P 3/31: hyperdense material in stomach may represent foreign body, no aortic aneurysm no pancreatitis, IVC filter in place  -AXR 3/31: unremarkable, no free air   -lipase WNL   -PPI gtt  -Central line due to pt having more peripheral access   -type and screen  -f/u TVUS   -f/u GI recs, Ob/GYN    #IVC filter?  -IVC filter seen on CT A/P imaging  -pt unable to provide history      Renal/electrolytes:   no active issues      Endocrine:   no active issues      Hematology/oncology:   no active issues      Dermatology/MSK  no active issues      Nutrition  Fluids: None  Electrolytes: Mg>2, K>4 replete PRN   Nutrition: NPO     Prophylaxis  DVT: None   GI: PPI gtt      AccessLines:  -Central   -Peripheral yes    Sommers:no  Dispo: Full 39 yo F with h/o as above presenting for Hematemesis with bright red blood with multiple episodes. Pt with only significant h/o colonic surgery for apparent perf in the past. Pt with dyspepsia like symptoms for the last 4 days and unknowingly aggravated symptoms with NSAIDS now with hematemesis. O/e pt noted to have tense, tender abdomen with guarding. Admitted to MICU for hematemesis likely 2/2 Peptic Ulcer and concerns for acute abdomen.      Neurology/psychiatry  No active issues      Cardiovascular:    Currently normotensive, not in hypovolemic shock (euvolemic on exam, not tachycardic, no chest pain)    Hemodynamics:   -Euvolemic on exam  -maintain MAP > 60-65  -active type and screen      Pulmonary:   No active issues      Infectious disease:   No leukocytosis, no fever, CXR clear    Gastrointestinal:   #Hematemesis  Presenting with 10/10 RUQ pain radiating to back with hematemesis and abdominal pain x 4 days . Exam notable for guarding. CXR negative for perforated viscous. Currently hemodynamically stable Differential include peptic ulcer 2/2 to recent advil use, gastritis, foreign body ingestion, Carole mcdaniel tear, less likely Boorhave syndrome. Other considerations include ovarian torsion, bowel obstruction.   -CT A/P 3/31: hyperdense material in stomach may represent foreign body, no aortic aneurysm no pancreatitis, IVC filter in place  -AXR 3/31: unremarkable, no free air   -lipase WNL   -PPI gtt  -Central line due to pt having more peripheral access   -type and screen  -f/u TVUS   -f/u GI recs, Ob/GYN    #IVC filter?  -IVC filter seen on CT A/P imaging  -pt unable to provide history      Renal/electrolytes:   no active issues      Endocrine:   no active issues      Hematology/oncology:   no active issues      Dermatology/MSK  no active issues      Nutrition  Fluids: None  Electrolytes: Mg>2, K>4 replete PRN   Nutrition: NPO     Prophylaxis  DVT: None in setting of hematemesis   GI: PPI gtt      AccessLines:  -Central   -Peripheral yes    Sommers:no  Dispo: Full

## 2019-03-31 NOTE — CONSULT NOTE ADULT - ATTENDING COMMENTS
Seen in ICU.  No additional vommiting.  Pain unchanged.  Ab very tender difusely with rigidity, no localization.  Chest clear , cor rr, ext warm.   Labs as above.  CT results reviewed.  No overt pathology that would explain patients findings.    A -severe abdominal pain of unclear cause/ upper gi bleed    Suggest:  Surg, gync to see  pelvic ultrasound  repeat labs lactate  GI to assess for endoscpy  possible repeat of ct with oral contrast

## 2019-03-31 NOTE — CONSULT NOTE ADULT - SUBJECTIVE AND OBJECTIVE BOX
Pt is a 39 yo postmenopausal P1 with h/o Colonic perf s/p surgery in Dec 18 in [facility] with no other PMH presents with c/o N/V and hematemesis starting yesterday evening associated with 10/10 epigastric/Rt UQ  Sharp abdominal pain radiating to the back. Nothing makes it better and movement makes it worse.   She reports the pain is now located in the epigastric region.  She denies any vaginal bleeding, and is currently sexually active.    OB/GYN Hx:  --  c/s; menopause at age 36 due to premature ovarian failure, follows with OBGYN at annual visits in [place], last saw ~1 year ago; no h/o STIs, reports vaginal discomfort on initial insertion during intercourse  Past Medical History: Denies   Past Surgical History: Colonic perf surgery   Medications: None   Allergies: IV contrast.   Social History: Never smoker, alcohol or recreational drugs.   Family History: Denies any pertinent surgery       PHYSICAL EXAM:   Vital Signs Last 24 Hrs  T(C): 36.8 (31 Mar 2019 13:32), Max: 36.8 (31 Mar 2019 08:50)  T(F): 98.2 (31 Mar 2019 13:32), Max: 98.3 (31 Mar 2019 12:06)  HR: 74 (31 Mar 2019 18:00) (74 - 94)  BP: 113/73 (31 Mar 2019 18:00) (113/73 - 152/91)  BP(mean): 95 (31 Mar 2019 18:00) (91 - 95)  RR: 12 (31 Mar 2019 18:00) (12 - 19)  SpO2: 99% (31 Mar 2019 18:00) (97% - 100%)    **************************  Constitutional: Alert & Oriented x3, NAD, NGT in place  Respiratory: regular work of breathing  Gastrointestinal: soft, non tender, positive bowel sounds, no rebound or guarding   Pelvic exam:   Extremities: no calf tenderness or swelling      LABS:                        11.3   6.58  )-----------( 236      ( 31 Mar 2019 12:27 )             34.9     -    142  |  108  |  15  ----------------------------<  122<H>  4.3   |  20<L>  |  0.69    Ca    9.4      31 Mar 2019 09:39    TPro  7.5  /  Alb  4.5  /  TBili  0.2  /  DBili  x   /  AST  20  /  ALT  26  /  AlkPhos  134<H>      PT/INR - ( 31 Mar 2019 13:52 )   PT: 11.2 sec;   INR: 0.99          PTT - ( 31 Mar 2019 13:52 )  PTT:35.1 sec    HCG Quantitative, Serum: .4 mIU/mL ( @ 09:39) Pt is a 39 yo postmenopausal P1 with h/o Colonic perf s/p surgery in Dec 18 in [facility] with no other PMH presents with c/o N/V and hematemesis starting yesterday evening associated with 10/10 epigastric/Rt UQ  Sharp abdominal pain radiating to the back. Nothing makes it better and movement makes it worse.   She reports the pain is now located in the epigastric region.  She denies any vaginal bleeding, and is currently sexually active.    OB/GYN Hx:  --  c/s; menopause at age 36 due to premature ovarian failure, follows with OBGYN at annual visits in [place], last saw ~1 year ago; no h/o STIs, reports vaginal discomfort on initial insertion during intercourse  Past Medical History: Denies   Past Surgical History: Colonic perf surgery   Medications: None   Allergies: IV contrast.   Social History: Never smoker, alcohol or recreational drugs.   Family History: Denies any pertinent surgery       PHYSICAL EXAM:   Vital Signs Last 24 Hrs  T(C): 36.8 (31 Mar 2019 13:32), Max: 36.8 (31 Mar 2019 08:50)  T(F): 98.2 (31 Mar 2019 13:32), Max: 98.3 (31 Mar 2019 12:06)  HR: 74 (31 Mar 2019 18:00) (74 - 94)  BP: 113/73 (31 Mar 2019 18:00) (113/73 - 152/91)  BP(mean): 95 (31 Mar 2019 18:00) (91 - 95)  RR: 12 (31 Mar 2019 18:00) (12 - 19)  SpO2: 99% (31 Mar 2019 18:00) (97% - 100%)    **************************  Constitutional: Alert & Oriented x3, NAD, NGT in place  Respiratory: regular work of breathing  Gastrointestinal: rigid abdomen, nondistended, diffusely tender on palpation, no rebound, +guarding,   Pelvic exam: deferred due to TVUS at bedside  Extremities: no calf tenderness or swelling      LABS:                        11.3   6.58  )-----------( 236      ( 31 Mar 2019 12:27 )             34.9     -    142  |  108  |  15  ----------------------------<  122<H>  4.3   |  20<L>  |  0.69    Ca    9.4      31 Mar 2019 09:39    TPro  7.5  /  Alb  4.5  /  TBili  0.2  /  DBili  x   /  AST  20  /  ALT  26  /  AlkPhos  134<H>      PT/INR - ( 31 Mar 2019 13:52 )   PT: 11.2 sec;   INR: 0.99          PTT - ( 31 Mar 2019 13:52 )  PTT:35.1 sec    HCG Quantitative, Serum: .4 mIU/mL ( @ 09:39)

## 2019-03-31 NOTE — CONSULT NOTE ADULT - ASSESSMENT
37 yo F with h/o as above presenting for Hematemesis with bright red blood with multiple episodes. Pt with only significant h/o colonic surgery for apparent perf in the past. Pt with dyspepsia like symptoms for the last 4 days and unknowingly aggravated symptoms with NSAIDS now with hematemesis. O/e pt noted to have tense, tender abdomen with guarding.  ICU consulted for hematemesis likely 2/2 Peptic Ulcer and concerns for acute abdomen. 37 yo F with h/o as above presenting for Hematemesis with bright red blood with multiple episodes. Pt with only significant h/o colonic surgery for apparent perf in the past. Pt with dyspepsia like symptoms for the last 4 days and unknowingly aggravated symptoms with NSAIDS now with hematemesis. O/e pt noted to have tense, tender abdomen with guarding.  ICU consulted for hematemesis likely 2/2 Peptic Ulcer and concerns for acute abdomen.     **Abdominal pain with Hematemesis likely 2/2 Bleeding peptic ulcer.   Pt with 4 day c/o abdominal pain worse since last night after taking NSAIDS for the pain with new episodes of hematemesis bright red blood.   Concern for bleeding ulcer vs perforation    -Type and screen.  -Central venous line given poor IV access  -CTAP without IV (contrast allergies)   -Protonix drip.  -Q4h CBC.   -GI consult.   -Surgery consult given exam concerning for acute abdomen with guarding and tense abdomen   -IV Dilaudid 1mg q6h prn for severe pain. (allergies to morphine)   -Pt will likely need to be electively intubated for EGD. 39 yo F with h/o as above presenting for Hematemesis with bright red blood with multiple episodes. Pt with only significant h/o colonic surgery for apparent perf in the past. Pt with dyspepsia like symptoms for the last 4 days and unknowingly aggravated symptoms with NSAIDS now with hematemesis. O/e pt noted to have tense, tender abdomen with guarding.  ICU consulted for hematemesis likely 2/2 Peptic Ulcer and concerns for acute abdomen.     **Abdominal pain with Hematemesis likely 2/2 Bleeding peptic ulcer.   Pt with 4 day c/o abdominal pain worse since last night after taking NSAIDS for the pain with new episodes of hematemesis bright red blood.   Concern for bleeding ulcer vs perforation    -Type and screen.  -Central venous line given poor IV access  -CTAP without IV (contrast allergies)   -Protonix drip.  -Q4h CBC.   -GI consult.   -Surgery consult given exam concerning for acute abdomen with guarding and tense abdomen   -IV Dilaudid 1mg q6h prn for severe pain. (allergies to morphine)   -Pt will likely need to be electively intubated for EGD.   -NPO

## 2019-03-31 NOTE — ED ADULT NURSE NOTE - OBJECTIVE STATEMENT
AOX4 +ambulatory patient reports hx of bowel resection in [facility] x January c/o right sided abdominal pain, vomiting blood and diarrhea. Patient states subjective fevers denies any blood in the stool

## 2019-03-31 NOTE — ED ADULT NURSE NOTE - OTHER COMPLAINTS
pt states she has a hx of intestinal perforation and resection that was done in [facility] in December 2018.

## 2019-03-31 NOTE — CONSULT NOTE ADULT - ASSESSMENT
47 yo F with hx of colonic perf 1 year ago prior colectomy bowel resection (X- lap in [facility]) and appy with abd pain and hematemesis    Hematemesis: likley 2/2 Carole mcdaniel/gastritis/PUD/  -hb normal at 12.4, /90, hr 78  -zofran as needed  -PPI IV   -npo  -CT abdomen in the setting of pain abdomen with vomiting, also has pmh of abd surgeries 47 yo F with hx of colonic perf 1 year ago prior colectomy bowel resection (X- lap in [facility]) and appy with abd pain and hematemesis    Hematemesis: likley 2/2 Carole mcdaniel/gastritis/PUD  -hb normal at 12.4, /90, hr 78  -CT abdomen shows ingested food/ blood in stomach, significant stool burden, no obstruction/perf, clip seen in stomach (pt denies any prior GI bleed)  -NGT for stomach decompression  -will plan for EGD today, will need intubation for procedure  -2 large bore IV  -trend cbc  -zofran as needed  -PPI IV   -npo

## 2019-03-31 NOTE — ED PROVIDER NOTE - OBJECTIVE STATEMENT
49 yo F with hx of colonic perf 1 year ago prior colectomy bowel resection (X- lap in [facility]) and appy with onset of abd pain last nite  N/V  x 4 episodes some brb  non bilious last BM 2 days ago  pos flatus  no prior hx of SBO since surgery  no dysuria or freq  no chills no vag dc or bleeding LMP  approx 3 weeks ago  no hx of PUD or gastritis  no etoh use  non smoker 49 yo F with hx of colonic perf 1 year ago prior colectomy bowel resection (X- lap in [facility]) and appy with onset of abd pain last nite  N/V  x 4 episodes some brb  non bilious last BM 2 days ago  pos flatus  no prior hx of SBO since surgery  no dysuria or freq  no chills no vag dc or bleeding LMP  approx 3 weeks ago  no hx of PUD or gastritis  no etoh use  non smoker  --pt now admits to taking advil x 4 days 47 yo F with hx of colonic perf 1 year ago prior colectomy bowel resection (X- lap in [facility]) and appy  20 years ago with onset of abd pain last nite at aprrox 11 pm  -N/V  x 4 episodes some brb  non bilious last BM 2 days ago  pos flatus  no prior hx of SBO since surgery  no dysuria or freq  no chills no vag dc or bleeding LMP  approx 3 weeks ago  no hx of PUD or gastritis  no etoh use  non smoker  --pt now admits to taking advil x 4 days

## 2019-03-31 NOTE — PROCEDURE NOTE - NSICDXPROCEDURE_GEN_ALL_CORE_FT
PROCEDURES:  Insertion of triple lumen catheter with imaging guidance 31-Mar-2019 22:24:23  Jaelyn Kyle
PROCEDURES:  Intubation, trachea 31-Mar-2019 17:00:05  Velma Fernandez

## 2019-03-31 NOTE — CONSULT NOTE ADULT - CONSULT REASON
Abdominal pain
Pt with c/o Abdominal pain with hematemesis
hematemesis
abdominal pain and hematemesis

## 2019-03-31 NOTE — PROGRESS NOTE ADULT - SUBJECTIVE AND OBJECTIVE BOX
Received verbal report that there were no significant findings on EGD, and no active bleeding. Abdominal exam remains soft, non-distended. Patient intubated and sedated so unable to assess tenderness/pain.

## 2019-03-31 NOTE — ED ADULT NURSE NOTE - NSIMPLEMENTINTERV_GEN_ALL_ED
Implemented All Universal Safety Interventions:  [place] to call system. Call bell, personal items and telephone within reach. Instruct patient to call for assistance. Room bathroom lighting operational. Non-slip footwear when patient is off stretcher. Physically safe environment: no spills, clutter or unnecessary equipment. Stretcher in lowest position, wheels locked, appropriate side rails in place.

## 2019-03-31 NOTE — CONSULT NOTE ADULT - ASSESSMENT
38 year old female w/ h/o Colonic perf s/p colectomy in 12/18 in [place], h/o appendectomy in early 2000s w/ no other known PMH presents with abdominal pain and hematemesis   - patient admitted to the MICU w/ GI and surgery consulted   - agree w/ protonix drip and would recommend GI endoscopy to determine source of GI bleed   - consider IR consult, patient may benefit from embolization if becomes hemodynamically unstable   - abdominal exam impressive, will perform serial abdominal exams, however AXR / CT scan unremarkable for acute intra abdominal pathology of presence of free air   - serial CBCs in the setting of UGI bleeding   - discussed with chief on call 38 year old female w/ h/o Colonic perf s/p colectomy in 12/18 in [place], h/o appendectomy in early 2000s w/ no other known PMH presents with abdominal pain and hematemesis   - patient admitted to the MICU w/ GI and surgery consulted   - agree w/ protonix drip and would recommend GI endoscopy to determine source of GI bleed, concern for possible PUD   - consider IR consult, patient may benefit from embolization if becomes hemodynamically unstable   - abdominal exam impressive, will perform serial abdominal exams, however AXR / CT scan unremarkable for acute intra abdominal pathology of presence of free air   - serial CBCs in the setting of UGI bleeding   - discussed with chief on call 38 year old female w/ h/o Colonic perf s/p colectomy in 12/18 in [place], h/o appendectomy in early 2000s w/ no other known PMH presents with abdominal pain and hematemesis   - patient admitted to the MICU w/ GI and surgery consulted   - agree w/ protonix drip and would recommend urgent GI endoscopy to determine source of GI bleed, concern for possible PUD   - IR consult, patient may benefit from embolization if becomes hemodynamically unstable   - abdominal exam impressive, will perform serial abdominal exams, however AXR / CT scan unremarkable for acute intra abdominal pathology of presence of free air   - serial CBCs in the setting of UGI bleeding   - continue resuscitative measures in MICU   - discussed with chief on call

## 2019-03-31 NOTE — H&P ADULT - NSHPLABSRESULTS_GEN_ALL_CORE
EXAM:  CT ABDOMEN AND PELVIS                          PROCEDURE DATE:  03/31/2019          INTERPRETATION:  CT of the ABDOMEN and PELVIS     INDICATION: Abdominal pain and hematemesis.    TECHNIQUE: CT of the abdomen and pelvis was performed. Intravenous and   oral contrast material not administered, as per ordering doctor's   request. Axial, sagittal, and coronal images were produced and reviewed.    PRIOR STUDIES: None.    FINDINGS: Images of the lower chest demonstrate asymmetric nodularity of   the left breast.     Hypodense liver parenchyma is consistent with hepatic steatosis.   No   radiopaque gallstones are seen.  The pancreas, spleen, adrenal glands,   and kidneys are unremarkable.    There is an IVC filter in place. A few prongs of the filter extend beyond   the wall of the inferior vena cava into the adjacent fat. No abdominal   aortic aneurysm is seen.     No lymphadenopathy or ascites in abdomen or pelvis.    Metallic clip present greater curvature of gastric body. There is   intraluminal hyperdense material in the stomach, which may represent   ingested food contents versus hemorrhage in the setting of hematemesis.   Metallic coils are present in the gastrohepatic ligament. Status post   appendectomy. Anastomosis present mid transverse colon. There is a   midline abdominal wall scar.    Images of the pelvis demonstrate the uterus and adnexae to be normal in   appearance. No filling defects are seen in the urinary bladder.     Evaluation of the osseous structures demonstrates compression deformity   of the superior endplate of the T11 vertebral body.    IMPRESSION:  1.  Hyperdense material in the stomach may represent blood products   versus ingested material.  2.  Hepatic steatosis.  3.  Asymmetric nodularity of left breast. Nonemergent breast ultrasound   and/or mammogram is recommended.  4.  Compression deformity T11 vertebral body.

## 2019-03-31 NOTE — H&P ADULT - ATTENDING COMMENTS
Seen in ICU.  On exam abd very tender to minimal palpation with rigidity., chest cealr , cor rr, ext warm. Labs as above.  Cr results reviewed.  No overt pathology that would explain patients finding    A -severe abdominal pain of unclear cause/ upper gi bleed    Suggest  Surg, gync to see  pelvic ultrasound  repeat labs lactate  GI to assess for endoscpy  possible repeat of ct with oral contrast Seen in ICU.  Pain unchanged, no additonal vommitting.  On exam abd very tender to minimal palpation diffusely without localization with rigidity., chest clear , cor rr, ext warm. Labs as above.  CT results reviewed.  No overt pathology that would explain patients findings.    A -severe abdominal pain of unclear cause/ upper gi bleed    Suggest:  Surg, gync to see  pelvic ultrasound  repeat labs lactate  GI to assess for endoscpy  possible repeat of ct with oral contrast

## 2019-03-31 NOTE — CONSULT NOTE ADULT - SUBJECTIVE AND OBJECTIVE BOX
HPI: 49 yo F with hx of colonic perf 1 year ago prior colectomy bowel resection (X- lap in [facility]) and appy with onset of abd pain last nite  N/V  x 4 episodes some brb  non bilious last BM 2 days ago,  passing  flatus    no prior hx of SBO since surgery  no dysuria or freq  no chills no vag dc or bleeding LMP  approx 3 weeks ago  no hx of PUD or gastritis  no etoh use  non smoker    Allergies    IV Contrast (Anaphylaxis)  morphine (Hives; Rash)    Intolerances      Home Medications:    MEDICATIONS:  MEDICATIONS  (STANDING):  diphenhydrAMINE   Injectable 25 milliGRAM(s) IV Push Once  HYDROmorphone  Injectable 1 milliGRAM(s) IV Push Once  ondansetron Injectable 4 milliGRAM(s) IV Push Once  pantoprazole  Injectable 80 milliGRAM(s) IV Push Once  pantoprazole Infusion 8 mG/Hr (10 mL/Hr) IV Continuous <Continuous>    MEDICATIONS  (PRN):    PAST MEDICAL & SURGICAL HISTORY:    FAMILY HISTORY:    SOCIAL HISTORY:  [place]: [ ] Current, [ ] Former, [ ] Never; Pack Years:  Alcohol:  Illicit Drugs:    REVIEW OF SYSTEMS:  CONSTITUTIONAL: No weakness, fevers or chills  HEENT: No visual changes; No vertigo or throat pain   NECK: No pain or stiffness  RESPIRATORY: No cough, wheezing, hemoptysis; No shortness of breath  CARDIOVASCULAR: No chest pain or palpitations  GASTROINTESTINAL: No abdominal or epigastric pain. No nausea, vomiting, or hematemesis; No diarrhea or constipation. No melena or hematochezia.  GENITOURINARY: No dysuria, frequency or hematuria  NEUROLOGICAL: No numbness or weakness  SKIN: No itching, burning, rashes, or lesions   All other 10 review of systems is negative unless indicated above.    Vital Signs Last 24 Hrs  T(C): 36.8 (31 Mar 2019 08:50), Max: 36.8 (31 Mar 2019 08:50)  T(F): 98.2 (31 Mar 2019 08:50), Max: 98.2 (31 Mar 2019 08:50)  HR: 79 (31 Mar 2019 08:50) (79 - 79)  BP: 140/86 (31 Mar 2019 08:50) (140/86 - 140/86)  BP(mean): --  RR: 19 (31 Mar 2019 08:50) (19 - 19)  SpO2: 99% (31 Mar 2019 08:50) (99% - 99%)      PHYSICAL EXAM:    General: Well developed; well nourished; in no acute distress  Eyes: Anicteric sclerae, moist conjunctivae  HENT: Moist mucous membranes  Neck: Trachea midline, supple  Lungs: Normal respiratory effors and no intercostal retractions  Cardiovascular: RRR  Abdomen: Soft, non-tender non-distended; Normal bowel sounds; No rebound or guarding  Extremities: Normal range of motion, No clubbing, cyanosis or edema  Neurological: Alert and oriented x3  Skin: Warm and dry. No obvious rash    LABS:                        12.4   8.36  )-----------( 247      ( 31 Mar 2019 09:39 )             38.3     03-31    142  |  108  |  15  ----------------------------<  122<H>  4.3   |  20<L>  |  0.69    Ca    9.4      31 Mar 2019 09:39    TPro  7.5  /  Alb  4.5  /  TBili  0.2  /  DBili  x   /  AST  20  /  ALT  26  /  AlkPhos  134<H>  03-31            RADIOLOGY & ADDITIONAL STUDIES:

## 2019-03-31 NOTE — ED PROVIDER NOTE - PROGRESS NOTE DETAILS
GI to see pt-  pos hematemesis - approx 100cc - no clots-  BP  VSS - ICU paged-  surgery to see as well  lactate neg

## 2019-03-31 NOTE — PROCEDURE NOTE - NSINFORMCONSENT_GEN_A_CORE
Benefits, risks, and possible complications of procedure explained to patient/caregiver who verbalized understanding and gave verbal consent.
Benefits, risks, and possible complications of procedure explained to patient/caregiver who verbalized understanding and gave verbal consent.
consent obtained by day team/Benefits, risks, and possible complications of procedure explained to patient/caregiver who verbalized understanding and gave written consent.

## 2019-03-31 NOTE — H&P ADULT - NSHPPHYSICALEXAM_GEN_ALL_CORE
Constitutional: Pt lying in stretcher visibly in pain   Head: NC/AT  Eyes: PERRLA, EOMI, clear conjunctiva  ENT: no nasal discharge; no oropharyngeal erythema or exudates; moist oral mucosa  Neck: supple; no JVD or thyromegaly  Respiratory: CTA B/L; no W/R/R, no retractions  Cardiac: +S1/S2; RRR; no M/R/G; PMI non-displaced  Gastrointestinal: Tender, guarding, tense to palpation, multiple laprascopic scars with midline scar.  Extremities: WWP, no clubbing or cyanosis; no peripheral edema  Vascular: 2+ radial, DP/PT pulses B/L  Lymphatic: no submandibular or cervical LAD  Neurologic: AAOx3; answers questions appropriately, follows commands, moves all extremities, CNII-XII grossly intact; no focal deficits, motor 5/5 in UE and LE, Reflexes 2+ in UE and LE b/l

## 2019-03-31 NOTE — CONSULT NOTE ADULT - ASSESSMENT
Pt is a 37 yo postmenopausal P1 with h/o Colonic perf s/p surgery in Dec 18 presenting with hematemesis.   - unable to perform pelvic exam due to TVUS being performed at time of evaluation  - will f/u results of TVUS  - due to patient's history and physical exam/location of pain, little concern for GYN etiology of pain Pt is a 37 yo postmenopausal P1 with h/o Colonic perf s/p surgery in Dec 18 presenting with hematemesis.   - unable to perform pelvic exam due to TVUS being performed at time of evaluation  - TVUS normal with no evidence of torsion or any ovarian or uterine masses or cysts  - due to patient's history and physical exam/location of pain, little concern for GYN etiology of pain  - GYN signing off

## 2019-04-01 VITALS
HEART RATE: 88 BPM | SYSTOLIC BLOOD PRESSURE: 117 MMHG | DIASTOLIC BLOOD PRESSURE: 82 MMHG | RESPIRATION RATE: 22 BRPM | OXYGEN SATURATION: 95 %

## 2019-04-01 LAB
ALBUMIN SERPL ELPH-MCNC: 3.6 G/DL — SIGNIFICANT CHANGE UP (ref 3.3–5)
ALP SERPL-CCNC: 110 U/L — SIGNIFICANT CHANGE UP (ref 40–120)
ALT FLD-CCNC: 23 U/L — SIGNIFICANT CHANGE UP (ref 10–45)
ANION GAP SERPL CALC-SCNC: 10 MMOL/L — SIGNIFICANT CHANGE UP (ref 5–17)
AST SERPL-CCNC: 23 U/L — SIGNIFICANT CHANGE UP (ref 10–40)
BASOPHILS # BLD AUTO: 0.02 K/UL — SIGNIFICANT CHANGE UP (ref 0–0.2)
BASOPHILS NFR BLD AUTO: 0.2 % — SIGNIFICANT CHANGE UP (ref 0–2)
BILIRUB SERPL-MCNC: 0.2 MG/DL — SIGNIFICANT CHANGE UP (ref 0.2–1.2)
BUN SERPL-MCNC: 15 MG/DL — SIGNIFICANT CHANGE UP (ref 7–23)
CALCIUM SERPL-MCNC: 8.5 MG/DL — SIGNIFICANT CHANGE UP (ref 8.4–10.5)
CHLORIDE SERPL-SCNC: 110 MMOL/L — HIGH (ref 96–108)
CO2 SERPL-SCNC: 22 MMOL/L — SIGNIFICANT CHANGE UP (ref 22–31)
CREAT SERPL-MCNC: 0.66 MG/DL — SIGNIFICANT CHANGE UP (ref 0.5–1.3)
EOSINOPHIL # BLD AUTO: 0.49 K/UL — SIGNIFICANT CHANGE UP (ref 0–0.5)
EOSINOPHIL NFR BLD AUTO: 5.2 % — SIGNIFICANT CHANGE UP (ref 0–6)
GLUCOSE SERPL-MCNC: 90 MG/DL — SIGNIFICANT CHANGE UP (ref 70–99)
HCT VFR BLD CALC: 30.5 % — LOW (ref 34.5–45)
HGB BLD-MCNC: 9.8 G/DL — LOW (ref 11.5–15.5)
IMM GRANULOCYTES NFR BLD AUTO: 0.4 % — SIGNIFICANT CHANGE UP (ref 0–1.5)
LYMPHOCYTES # BLD AUTO: 1.32 K/UL — SIGNIFICANT CHANGE UP (ref 1–3.3)
LYMPHOCYTES # BLD AUTO: 14.1 % — SIGNIFICANT CHANGE UP (ref 13–44)
MCHC RBC-ENTMCNC: 28.4 PG — SIGNIFICANT CHANGE UP (ref 27–34)
MCHC RBC-ENTMCNC: 32.1 GM/DL — SIGNIFICANT CHANGE UP (ref 32–36)
MCV RBC AUTO: 88.4 FL — SIGNIFICANT CHANGE UP (ref 80–100)
MONOCYTES # BLD AUTO: 0.75 K/UL — SIGNIFICANT CHANGE UP (ref 0–0.9)
MONOCYTES NFR BLD AUTO: 8 % — SIGNIFICANT CHANGE UP (ref 2–14)
NEUTROPHILS # BLD AUTO: 6.77 K/UL — SIGNIFICANT CHANGE UP (ref 1.8–7.4)
NEUTROPHILS NFR BLD AUTO: 72.1 % — SIGNIFICANT CHANGE UP (ref 43–77)
NRBC # BLD: 0 /100 WBCS — SIGNIFICANT CHANGE UP (ref 0–0)
PLATELET # BLD AUTO: 205 K/UL — SIGNIFICANT CHANGE UP (ref 150–400)
POTASSIUM SERPL-MCNC: 3.9 MMOL/L — SIGNIFICANT CHANGE UP (ref 3.5–5.3)
POTASSIUM SERPL-SCNC: 3.9 MMOL/L — SIGNIFICANT CHANGE UP (ref 3.5–5.3)
PROT SERPL-MCNC: 6 G/DL — SIGNIFICANT CHANGE UP (ref 6–8.3)
RBC # BLD: 3.45 M/UL — LOW (ref 3.8–5.2)
RBC # FLD: 16.2 % — HIGH (ref 10.3–14.5)
SODIUM SERPL-SCNC: 142 MMOL/L — SIGNIFICANT CHANGE UP (ref 135–145)
WBC # BLD: 9.39 K/UL — SIGNIFICANT CHANGE UP (ref 3.8–10.5)
WBC # FLD AUTO: 9.39 K/UL — SIGNIFICANT CHANGE UP (ref 3.8–10.5)

## 2019-04-01 PROCEDURE — 76830 TRANSVAGINAL US NON-OB: CPT

## 2019-04-01 PROCEDURE — 84295 ASSAY OF SERUM SODIUM: CPT

## 2019-04-01 PROCEDURE — 81003 URINALYSIS AUTO W/O SCOPE: CPT

## 2019-04-01 PROCEDURE — 94002 VENT MGMT INPAT INIT DAY: CPT

## 2019-04-01 PROCEDURE — 93005 ELECTROCARDIOGRAM TRACING: CPT

## 2019-04-01 PROCEDURE — 71046 X-RAY EXAM CHEST 2 VIEWS: CPT

## 2019-04-01 PROCEDURE — 76856 US EXAM PELVIC COMPLETE: CPT

## 2019-04-01 PROCEDURE — 74019 RADEX ABDOMEN 2 VIEWS: CPT

## 2019-04-01 PROCEDURE — 80053 COMPREHEN METABOLIC PANEL: CPT

## 2019-04-01 PROCEDURE — 82150 ASSAY OF AMYLASE: CPT

## 2019-04-01 PROCEDURE — 74018 RADEX ABDOMEN 1 VIEW: CPT | Mod: 26

## 2019-04-01 PROCEDURE — 83605 ASSAY OF LACTIC ACID: CPT

## 2019-04-01 PROCEDURE — 82803 BLOOD GASES ANY COMBINATION: CPT

## 2019-04-01 PROCEDURE — 94003 VENT MGMT INPAT SUBQ DAY: CPT

## 2019-04-01 PROCEDURE — 82330 ASSAY OF CALCIUM: CPT

## 2019-04-01 PROCEDURE — 86900 BLOOD TYPING SEROLOGIC ABO: CPT

## 2019-04-01 PROCEDURE — 71045 X-RAY EXAM CHEST 1 VIEW: CPT | Mod: 26

## 2019-04-01 PROCEDURE — 36415 COLL VENOUS BLD VENIPUNCTURE: CPT

## 2019-04-01 PROCEDURE — 74018 RADEX ABDOMEN 1 VIEW: CPT

## 2019-04-01 PROCEDURE — 74176 CT ABD & PELVIS W/O CONTRAST: CPT

## 2019-04-01 PROCEDURE — 86850 RBC ANTIBODY SCREEN: CPT

## 2019-04-01 PROCEDURE — 96376 TX/PRO/DX INJ SAME DRUG ADON: CPT

## 2019-04-01 PROCEDURE — 87040 BLOOD CULTURE FOR BACTERIA: CPT

## 2019-04-01 PROCEDURE — 96374 THER/PROPH/DIAG INJ IV PUSH: CPT

## 2019-04-01 PROCEDURE — 99238 HOSP IP/OBS DSCHRG MGMT 30/<: CPT

## 2019-04-01 PROCEDURE — 84132 ASSAY OF SERUM POTASSIUM: CPT

## 2019-04-01 PROCEDURE — 84702 CHORIONIC GONADOTROPIN TEST: CPT

## 2019-04-01 PROCEDURE — 86901 BLOOD TYPING SEROLOGIC RH(D): CPT

## 2019-04-01 PROCEDURE — 85610 PROTHROMBIN TIME: CPT

## 2019-04-01 PROCEDURE — 85027 COMPLETE CBC AUTOMATED: CPT

## 2019-04-01 PROCEDURE — 85025 COMPLETE CBC W/AUTO DIFF WBC: CPT

## 2019-04-01 PROCEDURE — 71045 X-RAY EXAM CHEST 1 VIEW: CPT

## 2019-04-01 PROCEDURE — 83690 ASSAY OF LIPASE: CPT

## 2019-04-01 PROCEDURE — 85730 THROMBOPLASTIN TIME PARTIAL: CPT

## 2019-04-01 PROCEDURE — 96375 TX/PRO/DX INJ NEW DRUG ADDON: CPT

## 2019-04-01 PROCEDURE — 99285 EMERGENCY DEPT VISIT HI MDM: CPT | Mod: 25

## 2019-04-01 PROCEDURE — 76705 ECHO EXAM OF ABDOMEN: CPT

## 2019-04-01 RX ORDER — DIPHENHYDRAMINE HCL 50 MG
25 CAPSULE ORAL ONCE
Qty: 0 | Refills: 0 | Status: COMPLETED | OUTPATIENT
Start: 2019-04-01 | End: 2019-04-01

## 2019-04-01 RX ORDER — SODIUM CHLORIDE 9 MG/ML
500 INJECTION INTRAMUSCULAR; INTRAVENOUS; SUBCUTANEOUS ONCE
Qty: 0 | Refills: 0 | Status: DISCONTINUED | OUTPATIENT
Start: 2019-04-01 | End: 2019-04-01

## 2019-04-01 RX ORDER — CHLORHEXIDINE GLUCONATE 213 G/1000ML
1 SOLUTION TOPICAL
Qty: 0 | Refills: 0 | Status: DISCONTINUED | OUTPATIENT
Start: 2019-04-01 | End: 2019-04-01

## 2019-04-01 RX ORDER — NOREPINEPHRINE BITARTRATE/D5W 8 MG/250ML
0.05 PLASTIC BAG, INJECTION (ML) INTRAVENOUS
Qty: 8 | Refills: 0 | Status: DISCONTINUED | OUTPATIENT
Start: 2019-04-01 | End: 2019-04-01

## 2019-04-01 RX ADMIN — Medication 25 MILLIGRAM(S): at 08:35

## 2019-04-01 RX ADMIN — PROPOFOL 2.27 MICROGRAM(S)/KG/MIN: 10 INJECTION, EMULSION INTRAVENOUS at 02:47

## 2019-04-01 RX ADMIN — Medication 7.08 MICROGRAM(S)/KG/MIN: at 01:40

## 2019-04-01 RX ADMIN — PANTOPRAZOLE SODIUM 40 MILLIGRAM(S): 20 TABLET, DELAYED RELEASE ORAL at 05:17

## 2019-04-01 NOTE — PROCEDURAL SAFETY CHECKLIST WITH OR WITHOUT SEDATION - NSPRESEDATIONFT_GEN_ALL_CORE
Physician confirms case reviewed for anesthesia consultation requirements.

## 2019-04-01 NOTE — DISCHARGE NOTE PROVIDER - HOSPITAL COURSE
37 yo F with PMhx colonic perforation s/p surgery and IVC filter placement in [facility] 12/2018 presented with complaints of hematemesis for the last 4 days and unknowingly aggravated symptoms with NSAIDS use and diffuse abdominal pain radiating to back .  Admitted to MICU for concern of hematemesis likely 2/2 peptic ulcer and acute abdomen on 3/31. She was intubated and underwent an EGD which was negative for any bleeding. TVUS and right upper ultrasound showed mild hepatomegaly. CT A/P revealed previous IVC filter placement and gastric clips consistent with previous surgery but no evidence of pancreatitis. Evaluated by GI, obgyn and vascular surgery and deemed hemodynamically stable. 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 On 4/1 pt wished to leave AMA. She was informed the risk of leaving and patient verbalized understanding through teach back.  She reported that her abdominal pain improved on day she wished to leave AMA.

## 2019-04-01 NOTE — PROVIDER CONTACT NOTE (CHANGE IN STATUS NOTIFICATION) - ACTION/TREATMENT ORDERED:
Johnathon Mantilla present at bedside to assess patient. Appropriate medications given as per MD order. Please see emr for further details. Will endorse to primary nurse. Safety precautions maintained

## 2019-04-01 NOTE — DISCHARGE NOTE PROVIDER - NSDCCPCAREPLAN_GEN_ALL_CORE_FT
PRINCIPAL DISCHARGE DIAGNOSIS  Diagnosis: Abdominal pain  Assessment and Plan of Treatment: You were admitted into the hospital for complaints of abdominal pain and bloody vomiting. You were in the MICU, intubated and had an esophagastroduodenoscopy to check for any bleeding which you did not have. A CT abdomen and pelvis showed no acute pathology. You were evaluated by multiple experts and deemed stable. If your abdominal pain returns please seek the nearest ED.      SECONDARY DISCHARGE DIAGNOSES  Diagnosis: Hematemesis  Assessment and Plan of Treatment:     Diagnosis: Abdominal pain  Assessment and Plan of Treatment:

## 2019-04-01 NOTE — PROVIDER CONTACT NOTE (CHANGE IN STATUS NOTIFICATION) - ASSESSMENT
AM sedation holiday for extubation / pt extremely anxious pt wrote note "I hear everyone talking outside, I'm not making my stomach pain up, that's not right.  I my very anxious more than anything a lot I know but I still feel the pain and it's not my fault"    MD/resident at bedside at the time saw note being written no new orders sedation restarted diprivan 40mcg pt still awake breathing above the vent but after sedation administration more calm.

## 2019-04-01 NOTE — PROCEDURAL SAFETY CHECKLIST WITH OR WITHOUT SEDATION - NSPRESURGSED_GEN_ALL_CORE
Present, accurate, and signed/Procedure consent
n/a/Procedural consent obtained
Present, accurate, and signed
Present, accurate, and signed

## 2019-04-01 NOTE — PROVIDER CONTACT NOTE (CHANGE IN STATUS NOTIFICATION) - ASSESSMENT
Upon reassessment patient noted to be hypotensive. Please review EMR for results. No signs and symptoms of acute distress noted.

## 2019-04-01 NOTE — CHART NOTE - NSCHARTNOTEFT_GEN_A_CORE
Notified at 11:30 PM on 3/31 that patient had been given Benadryl 25 mg IVP due to pruritis.  On signout, RN had been notified that there was a PRN order for Benadryl; however, there was none.  After benadryl pushed, MD notified, so order placed in [place].  Will continue to monitor pt.  Discussed with senior resident.

## 2019-04-01 NOTE — PROGRESS NOTE ADULT - SUBJECTIVE AND OBJECTIVE BOX
Subjective:  intubated, Anxious and agitated, complaining from abdominal pain      Vital Signs Last 24 Hrs  T(C): 38.3 (2019 09:05), Max: 38.3 (2019 09:05)  T(F): 100.9 (2019 09:05), Max: 100.9 (2019 09:05)  HR: 88 (2019 10:00) (48 - 94)  BP: 117/82 (2019 10:00) (82/45 - 156/100)  BP(mean): 96 (2019 10:00) (57 - 118)  RR: 22 (2019 10:00) (11 - 27)  SpO2: 95% (2019 10:00) (95% - 100%)    Physical Exam:  General: awake, agitated responding to questions  Pulmonary: Intubated   Cardiovascular: NSR  Abdominal:   Midline surgical incision healed  Voluntary guarding, diffuse tenderness, not distended    LABS:                        9.8    9.39  )-----------( 205      ( 2019 05:31 )             30.5     04-01    142  |  110<H>  |  15  ----------------------------<  90  3.9   |  22  |  0.66    Ca    8.5      2019 05:31    TPro  6.0  /  Alb  3.6  /  TBili  0.2  /  DBili  x   /  AST  23  /  ALT  23  /  AlkPhos  110  04-01    PT/INR - ( 31 Mar 2019 13:52 )   PT: 11.2 sec;   INR: 0.99          PTT - ( 31 Mar 2019 13:52 )  PTT:35.1 sec  CAPILLARY BLOOD GLUCOSE        Urinalysis Basic - ( 31 Mar 2019 23:26 )    Color: Yellow / Appearance: Clear / S.020 / pH: x  Gluc: x / Ketone: NEGATIVE  / Bili: Negative / Urobili: 0.2 E.U./dL   Blood: x / Protein: NEGATIVE mg/dL / Nitrite: NEGATIVE   Leuk Esterase: NEGATIVE / RBC: x / WBC x   Sq Epi: x / Non Sq Epi: x / Bacteria: x      LIVER FUNCTIONS - ( 2019 05:31 )  Alb: 3.6 g/dL / Pro: 6.0 g/dL / ALK PHOS: 110 U/L / ALT: 23 U/L / AST: 23 U/L / GGT: x

## 2019-04-01 NOTE — PROGRESS NOTE ADULT - ASSESSMENT
38 year old female w/ h/o Colonic perf s/p colectomy in 12/18 in [place], h/o appendectomy in early 2000s w/ no other known PMH presents with abdominal pain and hematemesis, S/p EGD with no active bleeding, patient was kept intubated for agitation during the procedure    Recs:  - Wean to extubation  - Serial abdominal exam  - Surgery 4C will follow  - Discussed with  38 year old female w/ h/o Colonic perf s/p colectomy in 12/18 in [place], h/o appendectomy in early 2000s w/ no other known PMH presents with abdominal pain and hematemesis, S/p EGD with no active bleeding, patient was kept intubated for agitation during the procedure, now with stable Hb, no evidence of bleeding	    Recs:  - Wean to extubation  - No surgical intervention  - Serial abdominal exam  - Surgery 4C will follow  - Discussed with

## 2019-04-01 NOTE — PROGRESS NOTE ADULT - SUBJECTIVE AND OBJECTIVE BOX
INTERVAL HPI/OVERNIGHT EVENTS:  Patient was seen and examined at bedside. Pt intubated but awake and writing of paper: ""I hear everyone talking outside, I'm not making my stomach pain up, that's not right.  I my very anxious more than anything a lot I know but I still feel the pain and it's not my fault"       VITAL SIGNS:  T(F): 98.9 (19 @ 06:03)  HR: 74 (19 @ 08:00)  BP: 91/60 (19 @ 08:00)  RR: 12 (19 @ 08:00)  SpO2: 100% (19 @ 08:00)  Wt(kg): --    PHYSICAL EXAM:  General: intubated, very anxious, uncomfortable appearing  Head: NC/AT  Eyes: PERRLA, EOMI, clear conjunctiva  Neck: supple; no JVD or thyromegaly  Respiratory: CTA B/L; no W/R/R, no retractions  Cardiac: +S1/S2; RRR; no M/R/G; PMI non-displaced  Gastrointestinal: Tender and tense to palpation, guarding, tense to palpation, multiple laprascopic scars with midline scar.  Extremities: WWP, no clubbing or cyanosis; no peripheral edema  Vascular: 2+ radial, DP/PT pulses B/L  Lymphatic: no submandibular or cervical LAD  Neurologic: AAOx3; answers questions appropriately, follows commands, moves all extremities, CNII-XII grossly intact; no focal deficits, motor 5/5 in UE and LE, Reflexes 2+ in UE and LE b/l    MEDICATIONS  (STANDING):  fentaNYL   Infusion 0.5 MICROgram(s)/kG/Hr (3.775 mL/Hr) IV Continuous <Continuous>  influenza   Vaccine 0.5 milliLiter(s) IntraMuscular once  norepinephrine Infusion 0.05 MICROgram(s)/kG/Min (7.078 mL/Hr) IV Continuous <Continuous>  pantoprazole  Injectable 40 milliGRAM(s) IV Push every 12 hours  propofol Infusion 5 MICROgram(s)/kG/Min (2.265 mL/Hr) IV Continuous <Continuous>  sodium chloride 0.9% Bolus 500 milliLiter(s) IV Bolus once    MEDICATIONS  (PRN):      Allergies    IV Contrast (Anaphylaxis)  morphine (Hives; Rash)    Intolerances        LABS:                        9.8    9.39  )-----------( 205      ( 2019 05:31 )             30.5     04-    142  |  110<H>  |  15  ----------------------------<  90  3.9   |  22  |  0.66    Ca    8.5      2019 05:31    TPro  6.0  /  Alb  3.6  /  TBili  0.2  /  DBili  x   /  AST  23  /  ALT  23  /  AlkPhos  110  04-    PT/INR - ( 31 Mar 2019 13:52 )   PT: 11.2 sec;   INR: 0.99          PTT - ( 31 Mar 2019 13:52 )  PTT:35.1 sec  Urinalysis Basic - ( 31 Mar 2019 23:26 )    Color: Yellow / Appearance: Clear / S.020 / pH: x  Gluc: x / Ketone: NEGATIVE  / Bili: Negative / Urobili: 0.2 E.U./dL   Blood: x / Protein: NEGATIVE mg/dL / Nitrite: NEGATIVE   Leuk Esterase: NEGATIVE / RBC: x / WBC x   Sq Epi: x / Non Sq Epi: x / Bacteria: x      CAPILLARY BLOOD GLUCOSE

## 2019-04-01 NOTE — PROGRESS NOTE ADULT - ASSESSMENT
37 yo F with colonic perforation or Hematemesis with bright red blood with multiple episodes. Pt with only significant h/o colonic surgery for apparent perf in the past. Pt with dyspepsia like symptoms for the last 4 days and unknowingly aggravated symptoms with NSAIDS now with hematemesis. O/e pt noted to have tense, tender abdomen with guarding. Admitted to MICU for hematemesis likely 2/2 Peptic Ulcer and concerns for acute abdomen.      Neurology/psychiatry  No active issues      Cardiovascular:    Currently normotensive, not in hypovolemic shock (euvolemic on exam, not tachycardic, no chest pain)    Hemodynamics:   -Euvolemic on exam  -maintain MAP > 60-65  -active type and screen      Pulmonary:   No active issues      Infectious disease:   No leukocytosis, no fever, CXR clear    Gastrointestinal:   #Hematemesis  Presenting with 10/10 RUQ pain radiating to back with hematemesis and abdominal pain x 4 days . Exam notable for guarding. CXR negative for perforated viscous. Currently hemodynamically stable Differential include peptic ulcer 2/2 to recent advil use, gastritis, foreign body ingestion, Carole mcdaniel tear, less likely Boorhave syndrome. Other considerations include ovarian torsion, bowel obstruction.   -CT A/P 3/31: hyperdense material in stomach may represent foreign body, no aortic aneurysm no pancreatitis, IVC filter in place  -AXR 3/31: unremarkable, no free air   -EGD 3/31: no active signs of bleeding   -lipase WNL   -PPI gtt  -Central line due to pt having more peripheral access   -type and screen  -f/u TVUS   -f/u GI recs, Ob/GYN    #IVC filter?  -IVC filter seen on CT A/P imaging  -pt unable to provide history      Renal/electrolytes:   no active issues      Endocrine:   no active issues      Hematology/oncology:   no active issues      Dermatology/MSK  no active issues      Nutrition  Fluids: None  Electrolytes: Mg>2, K>4 replete PRN   Nutrition: NPO     Prophylaxis  DVT: None in setting of hematemesis   GI: PPI gtt      AccessLines:  -Central   -Peripheral yes    Sommers:no  Dispo: Full 39 yo F with colonic perforation or Hematemesis with bright red blood with multiple episodes. Pt with only significant h/o colonic surgery for apparent perf in the past. Pt with dyspepsia like symptoms for the last 4 days and unknowingly aggravated symptoms with NSAIDS now with hematemesis. O/e pt noted to have tense, tender abdomen with guarding. Admitted to MICU for hematemesis likely 2/2 Peptic Ulcer and concerns for acute abdomen.      Neurology/psychiatry  No active issues      Cardiovascular:    Currently normotensive, not in hypovolemic shock (euvolemic on exam, not tachycardic, no chest pain)    Hemodynamics:   -Euvolemic on exam  -maintain MAP > 60-65  -active type and screen      Pulmonary:   Extubated today     Infectious disease:   No leukocytosis, no fever, CXR clear    Gastrointestinal:   #Hematemesis  Presenting with 10/10 RUQ pain radiating to back with hematemesis and abdominal pain x 4 days . Exam notable for guarding. CXR negative for perforated viscous. Currently hemodynamically stable Differential include peptic ulcer 2/2 to recent advil use, gastritis, foreign body ingestion, Carole mcdaniel tear, less likely Boorhave syndrome. Other considerations include ovarian torsion, bowel obstruction.   -CT A/P 3/31: hyperdense material in stomach may represent foreign body, no aortic aneurysm no pancreatitis, IVC filter in place  -AXR 3/31: unremarkable, no free air   -EGD 3/31: no active signs of bleeding   -lipase WNL   -PPI gtt  -Central line due to pt having more peripheral access   -type and screen  -f/u TVUS   -f/u GI recs, Ob/GYN    #IVC filter?  -IVC filter seen on CT A/P imaging  -pt unable to provide history      Renal/electrolytes:   no active issues      Endocrine:   no active issues      Hematology/oncology:   no active issues      Dermatology/MSK  no active issues      Nutrition  Fluids: None  Electrolytes: Mg>2, K>4 replete PRN   Nutrition: NPO     Prophylaxis  DVT: None in setting of hematemesis   GI: PPI gtt      AccessLines:  -Central   -Peripheral yes    Sommers:no  Dispo: Full

## 2019-04-01 NOTE — PROCEDURAL SAFETY CHECKLIST WITH OR WITHOUT SEDATION - NSPRESEDATION2FT_GEN_ALL_CORE
Anesthesia confirms case reviewed for anesthesia risk alert.

## 2019-04-04 DIAGNOSIS — K44.9 DIAPHRAGMATIC HERNIA WITHOUT OBSTRUCTION OR GANGRENE: ICD-10-CM

## 2019-04-04 DIAGNOSIS — R10.9 UNSPECIFIED ABDOMINAL PAIN: ICD-10-CM

## 2019-04-04 DIAGNOSIS — F68.10 FACTITIOUS DISORDER IMPOSED ON SELF, UNSPECIFIED: ICD-10-CM

## 2019-04-04 DIAGNOSIS — K29.70 GASTRITIS, UNSPECIFIED, WITHOUT BLEEDING: ICD-10-CM

## 2019-04-05 LAB
CULTURE RESULTS: SIGNIFICANT CHANGE UP
CULTURE RESULTS: SIGNIFICANT CHANGE UP
SPECIMEN SOURCE: SIGNIFICANT CHANGE UP
SPECIMEN SOURCE: SIGNIFICANT CHANGE UP

## 2019-12-03 NOTE — PROVIDER CONTACT NOTE (CHANGE IN STATUS NOTIFICATION) - SITUATION
cpap trial / sedation holiday
Upon reassessment patient noted to be hypotensive. Please review EMR for results. No signs and symptoms of acute distress noted.
yes

## 2020-11-09 NOTE — ED ADULT TRIAGE NOTE - LOCATION:
CRRT restarted. Alarms set and lines visible and secure. Both lines asp and flushes easily.   Right arm; Surgeon: Dr. Liu
